# Patient Record
Sex: MALE | HISPANIC OR LATINO | Employment: PART TIME | URBAN - NONMETROPOLITAN AREA
[De-identification: names, ages, dates, MRNs, and addresses within clinical notes are randomized per-mention and may not be internally consistent; named-entity substitution may affect disease eponyms.]

---

## 2022-01-20 ENCOUNTER — OFFICE VISIT (OUTPATIENT)
Dept: URGENT CARE | Facility: PHYSICIAN GROUP | Age: 52
End: 2022-01-20

## 2022-01-20 VITALS
OXYGEN SATURATION: 98 % | SYSTOLIC BLOOD PRESSURE: 110 MMHG | BODY MASS INDEX: 27.44 KG/M2 | RESPIRATION RATE: 12 BRPM | HEART RATE: 74 BPM | DIASTOLIC BLOOD PRESSURE: 92 MMHG | WEIGHT: 170 LBS | TEMPERATURE: 98.1 F

## 2022-01-20 DIAGNOSIS — K64.9 HEMORRHOIDS, UNSPECIFIED HEMORRHOID TYPE: ICD-10-CM

## 2022-01-20 PROCEDURE — 99203 OFFICE O/P NEW LOW 30 MIN: CPT | Performed by: NURSE PRACTITIONER

## 2022-01-20 RX ORDER — COVID-19 MOLECULAR TEST ASSAY
KIT MISCELLANEOUS
COMMUNITY
Start: 2021-12-21 | End: 2022-03-30

## 2022-01-21 ASSESSMENT — ENCOUNTER SYMPTOMS
FLANK PAIN: 0
NAUSEA: 0
CHILLS: 0
MYALGIAS: 0
VOMITING: 0
ABDOMINAL PAIN: 0
BLOOD IN STOOL: 0
DIARRHEA: 0
FEVER: 0
CONSTIPATION: 0

## 2022-01-21 NOTE — PROGRESS NOTES
Subjective     Brandin Brunson is a 51 y.o. male who presents with Hemorrhoids (blood in stool, [id number] off and on )            HPI  Has been experiencing small lump just inside rectum on and off for months. Has tried topical hydrocortisone cream but not helping has had this problems before. Noticed blood with wiping after bowel movement. Experiencing more harder smaller stool recently. Admits to low fiber diet and not enough water intake. Never dx with hemorrhoids.    PMH:  has no past medical history on file.  MEDS:   Current Outpatient Medications:   •  ID NOW COVID-19 Kit, , Disp: , Rfl:   ALLERGIES: No Known Allergies  SURGHX: History reviewed. No pertinent surgical history.  SOCHX:    FH: Family history was reviewed, no pertinent findings to report    Review of Systems   Constitutional: Negative for chills, fever and malaise/fatigue.   Gastrointestinal: Negative for abdominal pain, blood in stool, constipation, diarrhea, melena, nausea and vomiting.        Lump at rectum.   Genitourinary: Negative for dysuria, flank pain, frequency, hematuria and urgency.   Musculoskeletal: Negative for myalgias.   All other systems reviewed and are negative.             Objective     /92   Pulse 74   Temp 36.7 °C (98.1 °F) (Temporal)   Resp 12   Wt 77.1 kg (170 lb)   SpO2 98%   BMI 27.44 kg/m²      Physical Exam  Vitals reviewed.   Constitutional:       General: He is awake. He is not in acute distress.     Appearance: Normal appearance. He is well-developed. He is not ill-appearing, toxic-appearing or diaphoretic.   HENT:      Head: Normocephalic.   Eyes:      Conjunctiva/sclera: Conjunctivae normal.      Pupils: Pupils are equal, round, and reactive to light.   Cardiovascular:      Rate and Rhythm: Normal rate.   Pulmonary:      Effort: Pulmonary effort is normal.   Abdominal:      General: There is no distension.      Palpations: Abdomen is soft.      Tenderness: There is no abdominal tenderness. There  is no guarding or rebound.   Genitourinary:     Comments: Deferred exam of rectum.  Musculoskeletal:         General: Normal range of motion.      Cervical back: Normal range of motion and neck supple.   Skin:     General: Skin is warm and dry.   Neurological:      Mental Status: He is alert and oriented to person, place, and time.   Psychiatric:         Attention and Perception: Attention normal.         Mood and Affect: Mood normal.         Speech: Speech normal.         Behavior: Behavior normal. Behavior is cooperative.                             Assessment & Plan                1. Hemorrhoids, unspecified hemorrhoid type    -Recommend over the counter Preparation-H suppositories for internal hemorrhoid inflammation and swelling, may use external cream for external hemorrhoids as needed until improved  -Recommend Sitz bath or Epsom salt bath soaks as needed to decrease swelling  -May use Tucks pads to help with inflammation and swelling of non-bleeding hemorrhoids  -May use skin barrier like Desitin or Lanolin for after bowel movements to prevent chaffing or rubbing against irritated skin  -May use over the counter NSAID as needed for pain/discomfort  -May use over the counter stool softener as needed for any hard stool or Imodium for any diarrhea  -Maintain hydration/water intake  -Use pillow or seat cushion to alleviate pressure to hemorrhoids with sitting  -Monitor for any active bleeding, increased pain/swelling, abscess formation or discharge from hemorrhoid, fever, malaise- must be seen back in UC

## 2022-03-09 ENCOUNTER — TELEPHONE (OUTPATIENT)
Dept: SCHEDULING | Facility: IMAGING CENTER | Age: 52
End: 2022-03-09

## 2022-03-30 ENCOUNTER — OFFICE VISIT (OUTPATIENT)
Dept: MEDICAL GROUP | Facility: LAB | Age: 52
End: 2022-03-30

## 2022-03-30 VITALS
WEIGHT: 168 LBS | SYSTOLIC BLOOD PRESSURE: 108 MMHG | HEART RATE: 80 BPM | RESPIRATION RATE: 16 BRPM | OXYGEN SATURATION: 94 % | HEIGHT: 67 IN | BODY MASS INDEX: 26.37 KG/M2 | DIASTOLIC BLOOD PRESSURE: 76 MMHG | TEMPERATURE: 97.6 F

## 2022-03-30 DIAGNOSIS — Z12.11 SCREEN FOR COLON CANCER: ICD-10-CM

## 2022-03-30 DIAGNOSIS — K64.9 HEMORRHOIDS, UNSPECIFIED HEMORRHOID TYPE: ICD-10-CM

## 2022-03-30 DIAGNOSIS — Z59.89 DOES NOT HAVE HEALTH INSURANCE: ICD-10-CM

## 2022-03-30 PROCEDURE — 99213 OFFICE O/P EST LOW 20 MIN: CPT | Performed by: FAMILY MEDICINE

## 2022-03-30 RX ORDER — ASPIRIN 81 MG
TABLET, DELAYED RELEASE (ENTERIC COATED) ORAL
COMMUNITY

## 2022-03-30 SDOH — ECONOMIC STABILITY - INCOME SECURITY: OTHER PROBLEMS RELATED TO HOUSING AND ECONOMIC CIRCUMSTANCES: Z59.89

## 2022-03-30 ASSESSMENT — PATIENT HEALTH QUESTIONNAIRE - PHQ9: CLINICAL INTERPRETATION OF PHQ2 SCORE: 0

## 2022-03-30 NOTE — PATIENT INSTRUCTIONS
-Recommend over the counter Preparation-H suppositories for internal hemorrhoid inflammation and swelling, may use external cream for external hemorrhoids as needed until improved  -Recommend Sitz bath or Epsom salt bath soaks as needed to decrease swelling  -May use Tucks pads to help with inflammation and swelling of non-bleeding hemorrhoids  -May use skin barrier like Desitin or Lanolin for after bowel movements to prevent chaffing or rubbing against irritated skin  -May use over the counter NSAID as needed for pain/discomfort  -May use over the counter stool softener as needed for any hard stool or Imodium for any diarrhea  -Maintain hydration/water intake  -Use pillow or seat cushion to alleviate pressure to hemorrhoids with sitting  -Monitor for any active bleeding, increased pain/swelling, abscess formation or discharge from hemorrhoid, fever, malaise- must be seen back in UC

## 2022-03-30 NOTE — PROGRESS NOTES
"Brandin Brunson is a 51 y.o. male here to establish care and discuss hemorrhoids and colonoscopy.     Hemorrhoids have been going on for years, he was seen for this at urgent care a few months ago.  Continues to have intermittent pain as well as blood in stool or with wiping.  Has since improvement since then but still occur intermittently.  He has linked them to constipation.  He was referred for a colonoscopy and there is some confusion today as he is trying to get that done here.  He is currently without health insurance and is wondering what the cost of a colonoscopy will be.  No prior colonoscopy.  Has been using various over-the-counter treatments for hemorrhoids.  He also has questions regarding fiber in the diet.    Current medicines (including changes today)  Current Outpatient Medications   Medication Sig Dispense Refill   • Docusate Sodium (STOOL SOFTENER) 100 MG Tab Take  by mouth.       No current facility-administered medications for this visit.     He  has no past medical history on file.  He  has no past surgical history on file.     Social History     Social History Narrative   • Not on file     No family history on file.  No family status information on file.       ROS  See HPI     Objective:     Physical Exam:  /76 (BP Location: Left arm, Patient Position: Sitting, BP Cuff Size: Adult)   Pulse 80   Temp 36.4 °C (97.6 °F)   Resp 16   Ht 1.702 m (5' 7\")   Wt 76.2 kg (168 lb)   SpO2 94%  Body mass index is 26.31 kg/m².  Constitutional: Alert. Well appearing. No distress.  Skin: Warm, dry, good turgor, no visible rashes.  Eye: Equal, round and reactive to light, conjunctiva clear, lids normal.  Respiratory: Normal effort. Lungs are clear to auscultation bilaterally.  Cardiovascular: Regular rate and rhythm. Normal S1/S2. No murmurs, rubs or gallops.   Neuro: Moves all four extremities. No facial droop.  Psych: Answers questions appropriately. Normal affect and mood.    Assessment " and Plan:     1. Hemorrhoids, unspecified hemorrhoid type  Long-term issue, he has seen intermittent improvement with OTC medications.  Discussed high-fiber diet and he is given handout. Appears colonoscopy has been discussed with him in the past but he has a hard time getting this scheduled, currently does not have health insurance.  He is provided a referral to gastroenterology and discussed scheduling consultation with them where they can provide him more information on the cost of doing this without insurance.  - Referral to GI for Colonoscopy    2. Screen for colon cancer  - Referral to GI for Colonoscopy    3. Does not have health insurance  We discussed additional vaccinations, labs, and other health maintenance topics today but he currently not have health insurance and is trying to avoid more out-of-pocket cost.  He is provided contact information for local clinics which may be more cost effective for him.      Records requested from previous PCP.  Follow up: No follow-ups on file.         PLEASE NOTE: This note was created using voice recognition software.